# Patient Record
Sex: MALE | Race: WHITE | NOT HISPANIC OR LATINO | URBAN - NONMETROPOLITAN AREA
[De-identification: names, ages, dates, MRNs, and addresses within clinical notes are randomized per-mention and may not be internally consistent; named-entity substitution may affect disease eponyms.]

---

## 2019-03-26 ENCOUNTER — IMPORTED ENCOUNTER (OUTPATIENT)
Dept: URBAN - NONMETROPOLITAN AREA CLINIC 1 | Facility: CLINIC | Age: 84
End: 2019-03-26

## 2019-03-26 PROBLEM — H43.813: Noted: 2019-03-26

## 2019-03-26 PROBLEM — H52.4: Noted: 2019-03-26

## 2019-03-26 PROBLEM — H35.371: Noted: 2019-03-26

## 2019-03-26 PROBLEM — E11.3293: Noted: 2019-03-26

## 2019-03-26 PROBLEM — Z96.1: Noted: 2019-03-26

## 2019-03-26 PROBLEM — H26.493: Noted: 2019-03-26

## 2019-03-26 PROCEDURE — 92014 COMPRE OPH EXAM EST PT 1/>: CPT

## 2019-03-26 PROCEDURE — 92134 CPTRZ OPH DX IMG PST SGM RTA: CPT

## 2019-03-26 NOTE — PATIENT DISCUSSION
NIDDM (2010)- Discussed diagnosis in detail with patient- Stressed importance of good blood sugar control- Recommend no soda’s- History of diabetic retinopathy previously but no diabetic retinopathy seen on today's exam- Patient reports last A1C was 6.5  and last blood sugar was 114- Letter to Dr. 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 - The risk of retinal detachment in patients with PVDs was discussed with the patient and the warning signs of retinal detachment were carefully reviewed with the patient. - The patient was warned to return to the office or contact the ophthalmologist on call immediately if they experience signs of retinal detachment or changes in vision noted to be different from today. - Continue to monitor.  Astig/[place]- Discussed refractive status in detail w/ pt today- Deferred MR today no GLRx will be given- Continue to monitor; 's Notes: MR  DEFERRED 3/26/19DFE  3/26/19[facility] 3/26/2018OCT 3/26/19

## 2020-05-22 ENCOUNTER — IMPORTED ENCOUNTER (OUTPATIENT)
Dept: URBAN - NONMETROPOLITAN AREA CLINIC 1 | Facility: CLINIC | Age: 85
End: 2020-05-22

## 2020-05-22 PROBLEM — E11.3293: Noted: 2020-05-22

## 2020-05-22 PROBLEM — H26.493: Noted: 2019-03-26

## 2020-05-22 PROBLEM — H43.813: Noted: 2019-03-26

## 2020-05-22 PROBLEM — Z96.1: Noted: 2019-03-26

## 2020-05-22 PROBLEM — H35.371: Noted: 2020-05-22

## 2020-05-22 PROBLEM — H52.4: Noted: 2019-03-26

## 2020-05-22 PROCEDURE — 92134 CPTRZ OPH DX IMG PST SGM RTA: CPT

## 2020-05-22 PROCEDURE — 92014 COMPRE OPH EXAM EST PT 1/>: CPT

## 2020-05-22 NOTE — PATIENT DISCUSSION
NIDDM (2010)- Discussed diagnosis in detail with patient- Stressed importance of good blood sugar control- Recommend no soda’s- History of diabetic retinopathy previously but no diabetic retinopathy seen on today's exam- Patient reports last A1C was 6.6  and last blood sugar was 116- Letter to Dr. Tay Connor- Continue to monitorERM OD - Discussed diagnosis in detail with patient- Consider following the [address] in the future- OCT done today shows ERM OD and OS WNL - Continue to monitorPseudophakia OU- Discussed diagnosis in detail with patient- PCO OD noted but stable and no treatment needed at this time - Continue to monitor  PVD OU:- Discussed findings of exam in detail with the patient. - The risk of retinal detachment in patients with PVDs was discussed with the patient and the warning signs of retinal detachment were carefully reviewed with the patient. - The patient was warned to return to the office or contact the ophthalmologist on call immediately if they experience signs of retinal detachment or changes in vision noted to be different from today. - Continue to monitor.  Astig/[place]- Discussed refractive status in detail w/ pt today- Deferred MR today no GLRx will be given- Continue to monitor; 's Notes: MR  DEFERRED 5/22/20DFE  5/22/20[facility] 3/26/2018OCT 5/22/20

## 2022-04-09 ASSESSMENT — VISUAL ACUITY
OU_CC: 20/25+
OD_CC: 20/30
OS_CC: 20/30
OD_CC: 20/25+
OS_CC: 20/25
OU_CC: 20/30

## 2022-04-09 ASSESSMENT — TONOMETRY
OS_IOP_MMHG: 16
OS_IOP_MMHG: 17
OD_IOP_MMHG: 16
OD_IOP_MMHG: 16

## 2022-05-20 ENCOUNTER — FOLLOW UP (OUTPATIENT)
Dept: URBAN - NONMETROPOLITAN AREA CLINIC 1 | Facility: CLINIC | Age: 87
End: 2022-05-20

## 2022-05-20 DIAGNOSIS — H35.371: ICD-10-CM

## 2022-05-20 PROCEDURE — 99214 OFFICE O/P EST MOD 30 MIN: CPT

## 2022-05-20 PROCEDURE — 92134 CPTRZ OPH DX IMG PST SGM RTA: CPT

## 2022-05-20 ASSESSMENT — TONOMETRY
OS_IOP_MMHG: 12
OD_IOP_MMHG: 12

## 2022-05-20 ASSESSMENT — VISUAL ACUITY
OD_SC: 20/32-2
OS_SC: 20/29-1